# Patient Record
Sex: MALE | Race: BLACK OR AFRICAN AMERICAN | URBAN - METROPOLITAN AREA
[De-identification: names, ages, dates, MRNs, and addresses within clinical notes are randomized per-mention and may not be internally consistent; named-entity substitution may affect disease eponyms.]

---

## 2020-01-28 ENCOUNTER — HOSPITAL ENCOUNTER (EMERGENCY)
Age: 20
Discharge: HOME | End: 2020-01-28
Payer: COMMERCIAL

## 2020-01-28 VITALS
OXYGEN SATURATION: 96 % | HEIGHT: 70 IN | RESPIRATION RATE: 16 BRPM | DIASTOLIC BLOOD PRESSURE: 73 MMHG | BODY MASS INDEX: 27.92 KG/M2 | WEIGHT: 195 LBS | HEART RATE: 82 BPM | SYSTOLIC BLOOD PRESSURE: 141 MMHG | TEMPERATURE: 98 F

## 2020-01-28 DIAGNOSIS — R20.2 PARESTHESIAS: Primary | ICD-10-CM

## 2020-01-28 PROCEDURE — 99282 EMERGENCY DEPT VISIT SF MDM: CPT

## 2020-01-28 NOTE — ED PROVIDER NOTES
Patient Seen in: [facility] Emergency Department In [place]      History   Patient presents with:  Upper Extremity Injury    Stated Complaint: LEFT HAND AND LEFT ARM PAIN.   STATES IS [employer] FOR COLLEGE BASEBALL AND IT H*    25-year-old male presents to [facility] [address]   Temp 97.7 °F (36.5 °C) (Oral)   Resp 16   Ht 177.8 cm (5' 10\")   Wt 88.5 kg   SpO2 96%   BMI 27.98 kg/m²         Physical Exam  Vitals signs and nursing note reviewed. Constitutional:       Appearance: He is well-developed.    HENT:      April Paulas 11:49 am    Follow-up:  Mitchell Granda  [address]  [phone]    Schedule an appointment as soon as possible for a visit          Medications Prescribed:  Current Discharge Medication List

## (undated) NOTE — ED AVS SNAPSHOT
Rodrigo Mcbride   MRN: [id number]    Department:  [facility] Emergency Department in [place]   Date of Visit:  1/28/2020           Disclosure     Insurance plans vary and the physician(s) referred by the ER may not be covered by your plan.  Please tell this physician (or your personal doctor if your instructions are to return to your personal doctor) about any new or lasting problems. The primary care or specialist physician will see patients referred from the [facility] Emergency Department.  Arthor Bernheim

## (undated) NOTE — LETTER
Date & Time: 1/29/2020, 1:08 PM  Patient: Hector Emanuel  Encounter Provider(s):    JUAN Dalal       To Whom It May Concern:    Miguel Siddiqui was seen and treated in our department on 1/28/2020.  He should not participate in gym